# Patient Record
Sex: MALE
[De-identification: names, ages, dates, MRNs, and addresses within clinical notes are randomized per-mention and may not be internally consistent; named-entity substitution may affect disease eponyms.]

---

## 2021-08-23 ENCOUNTER — HOSPITAL ENCOUNTER (EMERGENCY)
Dept: HOSPITAL 97 - ER | Age: 51
LOS: 1 days | Discharge: HOME | End: 2021-08-24
Payer: SELF-PAY

## 2021-08-23 DIAGNOSIS — F17.210: ICD-10-CM

## 2021-08-23 DIAGNOSIS — I10: Primary | ICD-10-CM

## 2021-08-23 PROCEDURE — 71045 X-RAY EXAM CHEST 1 VIEW: CPT

## 2021-08-23 PROCEDURE — 84484 ASSAY OF TROPONIN QUANT: CPT

## 2021-08-23 PROCEDURE — 85610 PROTHROMBIN TIME: CPT

## 2021-08-23 PROCEDURE — 83735 ASSAY OF MAGNESIUM: CPT

## 2021-08-23 PROCEDURE — 80048 BASIC METABOLIC PNL TOTAL CA: CPT

## 2021-08-23 PROCEDURE — 96374 THER/PROPH/DIAG INJ IV PUSH: CPT

## 2021-08-23 PROCEDURE — 85025 COMPLETE CBC W/AUTO DIFF WBC: CPT

## 2021-08-23 PROCEDURE — 99285 EMERGENCY DEPT VISIT HI MDM: CPT

## 2021-08-23 PROCEDURE — 83880 ASSAY OF NATRIURETIC PEPTIDE: CPT

## 2021-08-23 PROCEDURE — 36415 COLL VENOUS BLD VENIPUNCTURE: CPT

## 2021-08-23 PROCEDURE — 93005 ELECTROCARDIOGRAM TRACING: CPT

## 2021-08-23 PROCEDURE — 80076 HEPATIC FUNCTION PANEL: CPT

## 2021-08-23 NOTE — XMS REPORT
Continuity of Care Document

                           Created on:2021



Patient:VENUS ENGLISH

Sex:Male

:1970

External Reference #:[id number]





Demographics







                          Address                   [address] [place] [id number]

 

                          Home Phone                [phone]

 

                          Work Phone                [phone]

 

                          Email Address             NONE

 

                          Preferred Language        Unknown

 

                          Marital Status            Unknown

 

                          Nondenominational Affiliation     Unknown

 

                          Race                      Unknown

 

                          Additional Race(s)        Unavailable

 

                          Ethnic Group              Unknown









Author







                          Organization              [facility]

t

 

                          Address                   [address] [place] [address] [address]



                                                    [place] [address]

 

                          Phone                     [phone]









Care Team Providers







                    Name                Role                Phone

 

                    Unavailable         Unavailable         Unavailable









Problems

This patient has no known problems.



Allergies, Adverse Reactions, Alerts

This patient has no known allergies or adverse reactions.



Medications

This patient has no known medications.



Procedures

This patient has no known procedures.



Results

This patient has no known results.

## 2021-08-24 VITALS — DIASTOLIC BLOOD PRESSURE: 87 MMHG | OXYGEN SATURATION: 98 % | SYSTOLIC BLOOD PRESSURE: 110 MMHG

## 2021-08-24 LAB
ALBUMIN SERPL BCP-MCNC: 3.6 G/DL (ref 3.4–5)
ALP SERPL-CCNC: 62 U/L (ref 45–117)
ALT SERPL W P-5'-P-CCNC: 49 U/L (ref 12–78)
AST SERPL W P-5'-P-CCNC: 39 U/L (ref 15–37)
BUN BLD-MCNC: 12 MG/DL (ref 7–18)
GLUCOSE SERPLBLD-MCNC: 129 MG/DL (ref 74–106)
HCT VFR BLD CALC: 44.2 % (ref 39.6–49)
INR BLD: 1.04
LYMPHOCYTES # SPEC AUTO: 2.5 K/UL (ref 0.7–4.9)
MAGNESIUM SERPL-MCNC: 2 MG/DL (ref 1.8–2.4)
NT-PROBNP SERPL-MCNC: 160 PG/ML (ref ?–125)
PMV BLD: 9.5 FL (ref 7.6–11.3)
POTASSIUM SERPL-SCNC: 3.7 MMOL/L (ref 3.5–5.1)
RBC # BLD: 5.32 M/UL (ref 4.33–5.43)
TROPONIN (EMERG DEPT USE ONLY): < 0.02 NG/ML (ref 0–0.04)

## 2021-08-24 NOTE — EDPHYS
Physician Documentation                                                                           

 [facility]                                                                 

Name: Philip Campos                                                                               

Age: 51 yrs                                                                                       

Sex: Male                                                                                         

: 1970                                                                                   

MRN: [id number]                                                                                   

Arrival Date: 2021                                                                          

Time: 23:36                                                                                       

Account#: [id number]                                                                            

Bed 3                                                                                             

Private MD:                                                                                       

ED Physician Daniel Domínguez                                                                     

HPI:                                                                                              

                                                                                             

06:52 This 51 yrs old  Male presents to ER via EMS with complaints of Is a            tw4 

      51-year-old male comes in complaining of high blood pressure after being noncompliant       

      with his medication.                                                                        

06:52 The patient has elevated blood pressure and discovered this at home. Onset: The         tw4 

      symptoms/episode began/occurred today. Modifying factors: The symptoms are aggravated       

      by. Associated signs and symptoms: Pertinent positives: dizziness, lightheadedness,         

      Pertinent negatives: chest pain, headache, nausea, visual changes. The patient has not      

      experienced similar symptoms in the past.                                                   

                                                                                                  

Historical:                                                                                       

- Allergies:                                                                                      

                                                                                             

23:48 No Known Allergies;                                                                     jb4 

- Home Meds:                                                                                      

23:48 Lisinopril Oral [Active];                                                               jb4 

- PMHx:                                                                                           

23:48 Back problems/pain; Hypertension;                                                       jb4 

- PSHx:                                                                                           

23:48 None;                                                                                   jb4 

                                                                                                  

- Immunization history:: Adult Immunizations up to date.                                          

- Social history:: Smoking status: Patient reports the use of cigarette tobacco                   

  products, smokes one-half pack cigarettes per day, Patient uses alcohol, only on a              

  social basis. street drugs, marijuana.                                                          

                                                                                                  

                                                                                                  

ROS:                                                                                              

                                                                                             

06:52 Constitutional: Negative for fever, chills, and weight loss, Eyes: Negative for injury, tw4 

      pain, redness, and discharge, Cardiovascular: Negative for chest pain, palpitations,        

      and edema, Respiratory: Negative for shortness of breath, cough, wheezing, and              

      pleuritic chest pain, Abdomen/GI: Negative for abdominal pain, nausea, vomiting,            

      diarrhea, and constipation, Back: Negative for injury and pain, MS/Extremity: Negative      

      for injury and deformity, Skin: Negative for injury, rash, and discoloration, Neuro:        

      Negative for headache, weakness, numbness, tingling, and seizure.                           

                                                                                                  

Exam:                                                                                             

06:52 Constitutional:  This is a well developed, well nourished patient who is awake, alert,  tw4 

      and in no acute distress. Head/Face:  Normocephalic, atraumatic. Chest/axilla:  Normal      

      chest wall appearance and motion.  Nontender with no deformity.  No lesions are             

      appreciated. Cardiovascular:  Regular rate and rhythm with a normal S1 and S2.  No          

      gallops, murmurs, or rubs.  Normal PMI, no JVD.  No pulse deficits. Respiratory:  Lungs     

      have equal breath sounds bilaterally, clear to auscultation and percussion.  No rales,      

      rhonchi or wheezes noted.  No increased work of breathing, no retractions or nasal          

      flaring. Abdomen/GI:  Soft, non-tender, with normal bowel sounds.  No distension or         

      tympany.  No guarding or rebound.  No evidence of tenderness throughout. Back:  No          

      spinal tenderness.  No costovertebral tenderness.  Full range of motion. MS/ Extremity:     

       Pulses equal, no cyanosis.  Neurovascular intact.  Full, normal range of motion.           

      Neuro:  Awake and alert, GCS 15, oriented to person, place, time, and situation.            

      Cranial nerves II-XII grossly intact.  Motor strength 5/5 in all extremities.  Sensory      

      grossly intact.  Cerebellar exam normal.  Normal gait.                                      

                                                                                                  

Vital Signs:                                                                                      

                                                                                             

23:46  / 125; Pulse 70; Resp 16; Pulse Ox 100% on R/A; Weight 107.95 kg (R); Height 5   jb4 

      ft. 5 in. (165.10 cm); Pain 7/10;                                                           

                                                                                             

01:00  / 123; Pulse 78; Resp 22; Pulse Ox 100% on R/A;                                  jb4 

02:00  / 100; Pulse 70; Resp 13; Pulse Ox 100% on R/A;                                  jb4 

03:20  / 87; Pulse 78; Resp 16; Pulse Ox 98% on R/A;                                    jb4 

                                                                                             

23:46 Body Mass Index 39.60 (107.95 kg, 165.10 cm)                                            jb4 

                                                                                                  

MDM:                                                                                              

                                                                                             

23:43 Patient medically screened.                                                             tw4 

                                                                                             

06:54 Differential diagnosis: hypertensive crisis, Malignant HTN. Data reviewed: vital signs, tw4 

      nurses notes. Data interpreted: Pulse oximetry: Interpretation: normal. Counseling: I       

      had a detailed discussion with the patient and/or guardian regarding: the historical        

      points, exam findings, and any diagnostic results supporting the discharge/admit            

      diagnosis. Medication response: hydralazine. Special discussion: I discussed with the       

      patient/guardian in detail that at this point there is no indication for admission to       

      the hospital. It is understood, however, that if the symptoms persist or worsen the         

      patient needs to return immediately for re-evaluation.                                      

                                                                                                  

                                                                                             

00:15 Order name: Basic Metabolic Panel; Complete Time: 02:                                                                                             

02:31 Interpretation: Normal except: GLUC 129.                                                                                                                                             

00:15 Order name: CBC with Diff; Complete Time: 02:                                                                                             

02:32 Interpretation: Within normal limits.                                                                                                                                                

00:15 Order name: LFT's; Complete Time: 02:                                                                                             

02:32 Interpretation: Normal except: AST 39; GLOB 3.6; A/G 1.0.                                                                                                                            

00:15 Order name: Magnesium; Complete Time: 02:                                                                                             

02:32 Interpretation: Within normal limits: MG 2.0.                                                                                                                                        

00:15 Order name: NT PRO-BNP; Complete Time: 02:                                                                                             

02:32 Interpretation: Normal except: NT PRO-.                                                                                                                                       

00:15 Order name: PT-INR; Complete Time: 02:                                                                                             

02:32 Interpretation: Within normal limits: PT 12.0; INR <p>1.04</p>.                                                                                                                      

00:15 Order name: Troponin (emerg Dept Use Only); Complete Time: 02:                                                                                             

02:32 Interpretation: Within normal limits: TROPED < 0.02.                                                                                                                                 

00:15 Order name: XRAY Chest (1 view)                                                                                                                                                      

00:15 Order name: EKG; Complete Time: 00:16                                                                                                                                                

00:15 Order name: Cardiac monitoring; Complete Time: 01:                                                                                             

00:15 Order name: EKG - Nurse/Tech; Complete Time: :                                                                                             

00:15 Order name: IV Saline Lock; Complete Time: :                                                                                             

00:15 Order name: Labs collected and sent; Complete Time: 01:                                                                                             

00:15 Order name: O2 Per Protocol; Complete Time: :                                                                                             

00:15 Order name: O2 Sat Monitoring; Complete Time: 01:03                                     tw4 

                                                                                                  

EC:52 Rate is 72 beats/min. Rhythm is regular. QRS Axis is Normal. [place] interval is normal. QRS tw4 

      interval is normal. QT interval is normal. No Q waves. T waves are Inverted in leads        

      III, V6. No ST changes noted. Clinical impression: NSR w/ Non-specific ST/T Changes.        

      Interpreted by me. Reviewed by me.                                                          

                                                                                                  

Administered Medications:                                                                         

: Drug: hydrALAZINE 20 mg Route: IVP; Site: right antecubital;                            jb4 

03:00 Follow up: Response: No adverse reaction; Blood pressure is lowered                     jb4 

01:22 Drug: cloNIDine 0.2 mg Route: PO;                                                       jb4 

03:00 Follow up: Response: No adverse reaction; Blood pressure is lowered                     jb4 

                                                                                                  

                                                                                                  

Disposition Summary:                                                                              

21 02:49                                                                                    

Discharge Ordered                                                                                 

      Location: Home                                                                          tw4 

      Problem: new                                                                            tw4 

      Symptoms: have improved                                                                 tw4 

      Condition: Stable                                                                       tw4 

      Diagnosis                                                                                   

        - Essential (primary) hypertension                                                    tw4 

      Followup:                                                                               tw4 

        - With: Private Physician                                                                  

        - When: Upon discharge from the Emergency Department                                       

        - Reason: Recheck today's complaints, Continuance of care, Re-evaluation by your           

      physician                                                                                   

      Discharge Instructions:                                                                     

        - Discharge Summary Sheet                                                             tw4 

        - Hypertension, Adult                                                                 tw4 

        - Hypertension, Adult, Easy-to-Read                                                   tw4 

      Forms:                                                                                      

        - Medication Reconciliation Form                                                      tw4 

        - Thank You Letter                                                                    tw4 

        - Antibiotic Education                                                                tw4 

        - Prescription Opioid Use                                                             tw4 

      Prescriptions:                                                                              

        - Norvasc 10 mg Oral Tablet                                                                

            - take 1 tablet by ORAL route once daily; 30 tablet; Refills: 0, Product          tw4 

      Selection Permitted                                                                         

Signatures:                                                                                       

Dispatcher [employer]                           Octavio Barr RN RN   jb4                                                  

Daniel Domínguez MD MD   tw4                                                  

                                                                                                  

Corrections: (The following items were deleted from the chart)                                    

                                                                                             

23:50 23:48 Allergies: Aspirin; jb4                                                           jb4 

                                                                                             

01:48 01:43 Manual Differential ordered. EDMS                                                 EDMS

                                                                                                  

**************************************************************************************************

## 2021-08-24 NOTE — EKG
Test Date:    2021-08-24               Test Time:    00:56:48

Technician:   MARIA DOLORES                                    

                                                     

MEASUREMENT RESULTS:                                       

Intervals:                                           

Rate:         72                                     

WA:           134                                    

QRSD:         100                                    

QT:           432                                    

QTc:          473                                    

Axis:                                                

P:            50                                     

WA:           134                                    

QRS:          56                                     

T:            17                                     

                                                     

INTERPRETIVE STATEMENTS:                                       

                                                     

Normal sinus rhythm

Nonspecific T wave abnormality

Prolonged QT

Abnormal ECG

Compared to ECG 12/28/2015 13:58:42

T-wave abnormality now present

Prolonged QT interval now present



Electronically Signed On 08-24-21 07:36:12 CDT by Jeff Ellis

## 2021-08-24 NOTE — ER
Nurse's Notes                                                                                     

 [facility]                                                                 

Name: Philip Campos                                                                               

Age: 51 yrs                                                                                       

Sex: Male                                                                                         

: 1970                                                                                   

MRN: [id number]                                                                                   

Arrival Date: 2021                                                                          

Time: 23:36                                                                                       

Account#: [id number]                                                                            

Bed 3                                                                                             

Private MD:                                                                                       

Diagnosis: Essential (primary) hypertension                                                       

                                                                                                  

Presentation:                                                                                     

                                                                                             

23:46 Chief complaint: EMS states: Pt is complaining of HTN, has a history of HTN and as      jb4 

      locked up for 5 days in [facility] without the ability to take his medication. Is now             

      reporting headache, dizziness, and nausea. His b/p read 206/130. Coronavirus screen:        

      Client denies travel out of the [place] in the last 14 days. Ebola Screen: No symptoms or      

      risks identified at this time. Initial Sepsis Screen: Does the patient meet any 2           

      criteria? No. Patient's initial sepsis screen is negative. Does the patient have a          

      suspected source of infection? No. Patient's initial sepsis screen is negative. Risk        

      Assessment: Do you want to hurt yourself or someone else? Patient reports no desire to      

      harm self or others. Onset of symptoms was 2021. Transition of care: patient     

      was not received from another setting of care.                                              

23:46 Method Of Arrival: EMS: [facility]                                                jb4 

23:46 Acuity: DALIA 3                                                                           jb4 

                                                                                                  

Historical:                                                                                       

- Allergies:                                                                                      

23:48 No Known Allergies;                                                                     jb4 

- Home Meds:                                                                                      

23:48 Lisinopril Oral [Active];                                                               jb4 

- PMHx:                                                                                           

23:48 Back problems/pain; Hypertension;                                                       jb4 

- PSHx:                                                                                           

23:48 None;                                                                                   jb4 

                                                                                                  

- Immunization history:: Adult Immunizations up to date.                                          

- Social history:: Smoking status: Patient reports the use of cigarette tobacco                   

  products, smokes one-half pack cigarettes per day, Patient uses alcohol, only on a              

  social basis. street drugs, marijuana.                                                          

                                                                                                  

                                                                                                  

Screenin:50 Abuse screen: Denies threats or abuse. Nutritional screening: No deficits noted.        jb4 

      Tuberculosis screening: No symptoms or risk factors identified. Fall Risk None              

      identified.                                                                                 

                                                                                                  

Assessment:                                                                                       

23:50 General: Appears in no apparent distress. comfortable, Behavior is calm, cooperative,   jb4 

      appropriate for age. Pain: Complains of pain in headache Pain does not radiate. Pain        

      currently is 7 out of 10 on a pain scale. Neuro: Level of Consciousness is awake,           

      alert, obeys commands, Oriented to person, place, time, situation. Cardiovascular:          

      Patient's skin is warm and dry. Respiratory: Airway is patent Respiratory effort is         

      even, unlabored, Respiratory pattern is regular, symmetrical. GI: No signs and/or           

      symptoms were reported involving the gastrointestinal system. : No signs and/or           

      symptoms were reported regarding the genitourinary system. EENT: No signs and/or            

      symptoms were reported regarding the EENT system. Derm: Skin is intact, Skin is pink,       

      warm \T\ dry. normal. Musculoskeletal: No signs and/or symptoms reported regarding the      

      musculoskeletal system.                                                                     

                                                                                             

01:00 Reassessment: Patient appears in no apparent distress at this time. Patient and/or      jb4 

      family updated on plan of care and expected duration. Pain level reassessed. Patient is     

      alert, oriented x 3, equal unlabored respirations, skin warm/dry/pink.                      

02:13 Reassessment: Patient appears in no apparent distress at this time. Patient and/or      jb4 

      family updated on plan of care and expected duration. Pain level reassessed. Patient is     

      alert, oriented x 3, equal unlabored respirations, skin warm/dry/pink.                      

03:29 Reassessment: Patient appears in no apparent distress at this time. Patient and/or      jb4 

      family updated on plan of care and expected duration. Pain level reassessed. Patient is     

      alert, oriented x 3, equal unlabored respirations, skin warm/dry/pink.                      

                                                                                                  

Vital Signs:                                                                                      

                                                                                             

23:46  / 125; Pulse 70; Resp 16; Pulse Ox 100% on R/A; Weight 107.95 kg (R); Height 5   4 

      ft. 5 in. (165.10 cm); Pain 7/10;                                                           

                                                                                             

01:00  / 123; Pulse 78; Resp 22; Pulse Ox 100% on R/A;                                  jb4 

02:00  / 100; Pulse 70; Resp 13; Pulse Ox 100% on R/A;                                  jb4 

03:20  / 87; Pulse 78; Resp 16; Pulse Ox 98% on R/A;                                    jb4 

                                                                                             

23:46 Body Mass Index 39.60 (107.95 kg, 165.10 cm)                                            [facility] 

                                                                                                  

ED Course:                                                                                        

                                                                                             

23:36 Patient arrived in ED.                                                                  mw2 

23:43 Daniel Domínguez MD is Attending Physician.                                            tw4 

23:46 Octavio Conklin RN is Primary Nurse.                                                     jb4 

23:48 Triage completed.                                                                       jb4 

23:48 Arm band placed on right wrist.                                                         jb4 

23:50 Patient has correct armband on for positive identification. Bed in low position. Call   jb4 

      light in reach. Side rails up X 1. Cardiac monitor on. Pulse ox on. NIBP on.                

                                                                                             

00:46 XRAY Chest (1 view) In Process Unspecified.                                             EDMS

01:07 Inserted saline lock: 22 gauge in right antecubital area, using aseptic technique.      ds4 

      Blood collected. Missed attempt(s): 20 gauge in right wrist. Bleeding controlled, band      

      aid applied, catheter tip intact.                                                           

03:20 No provider procedures requiring assistance completed. IV discontinued, intact,         jb4 

      bleeding controlled, No redness/swelling at site. Pressure dressing applied.                

                                                                                                  

Administered Medications:                                                                         

01:22 Drug: hydrALAZINE 20 mg Route: IVP; Site: right antecubital;                            4 

03:00 Follow up: Response: No adverse reaction; Blood pressure is lowered                     [facility] 

01:22 Drug: cloNIDine 0.2 mg Route: PO;                                                       jb4 

03:00 Follow up: Response: No adverse reaction; Blood pressure is lowered                     [facility] 

                                                                                                  

                                                                                                  

Outcome:                                                                                          

02:49 Discharge ordered by MD.                                                                tw4 

03:20 Discharged to home ambulatory.                                                          jb4 

03:20 Condition: stable                                                                           

03:20 Discharge instructions given to patient, Instructed on discharge instructions, follow       

      up and referral plans. medication usage, Demonstrated understanding of instructions,        

      follow-up care, medications, Prescriptions given X 1.                                       

03:30 Patient left the ED.                                                                    4 

                                                                                                  

Signatures:                                                                                       

Dispatcher [employer]                           [facility]                                                 

Flip Oneill                             4                                                  

Octavio Conklin RN                       RN   jb4                                                  

Daniel Domínguez MD MD   tw4                                                  

Dangelo Prather                            mw2                                                  

                                                                                                  

Corrections: (The following items were deleted from the chart)                                    

                                                                                             

23:50 23:48 Allergies: Aspirin; jb4                                                           jb4 

                                                                                                  

**************************************************************************************************

## 2021-08-24 NOTE — RAD REPORT
EXAM DESCRIPTION:  Fina Single View8/24/2021 12:46 am

 

CLINICAL HISTORY:  Hypertension

 

COMPARISON:  2019

 

FINDINGS:   The lungs appear clear of acute infiltrate. The heart is mildly enlarged

 

IMPRESSION:   No acute abnormalities displayed

## 2022-05-14 ENCOUNTER — HOSPITAL ENCOUNTER (EMERGENCY)
Dept: HOSPITAL 97 - ER | Age: 52
Discharge: HOME | End: 2022-05-14
Payer: SELF-PAY

## 2022-05-14 VITALS — TEMPERATURE: 97.5 F

## 2022-05-14 VITALS — OXYGEN SATURATION: 99 %

## 2022-05-14 VITALS — DIASTOLIC BLOOD PRESSURE: 90 MMHG | SYSTOLIC BLOOD PRESSURE: 154 MMHG

## 2022-05-14 DIAGNOSIS — F17.210: ICD-10-CM

## 2022-05-14 DIAGNOSIS — R07.81: ICD-10-CM

## 2022-05-14 DIAGNOSIS — S63.265A: ICD-10-CM

## 2022-05-14 DIAGNOSIS — I10: Primary | ICD-10-CM

## 2022-05-14 DIAGNOSIS — M79.642: ICD-10-CM

## 2022-05-14 LAB
BUN BLD-MCNC: 11 MG/DL (ref 7–18)
GLUCOSE SERPLBLD-MCNC: 138 MG/DL (ref 74–106)
HCT VFR BLD CALC: 42.6 % (ref 39.6–49)
LYMPHOCYTES # SPEC AUTO: 2.2 K/UL (ref 0.7–4.9)
PMV BLD: 8.7 FL (ref 7.6–11.3)
POTASSIUM SERPL-SCNC: 3.6 MMOL/L (ref 3.5–5.1)
RBC # BLD: 5.23 M/UL (ref 4.33–5.43)

## 2022-05-14 PROCEDURE — 36415 COLL VENOUS BLD VENIPUNCTURE: CPT

## 2022-05-14 PROCEDURE — 85025 COMPLETE CBC W/AUTO DIFF WBC: CPT

## 2022-05-14 PROCEDURE — 71045 X-RAY EXAM CHEST 1 VIEW: CPT

## 2022-05-14 PROCEDURE — 0RSVXZZ REPOSITION LEFT METACARPOPHALANGEAL JOINT, EXTERNAL APPROACH: ICD-10-PCS

## 2022-05-14 PROCEDURE — 99284 EMERGENCY DEPT VISIT MOD MDM: CPT

## 2022-05-14 PROCEDURE — 93005 ELECTROCARDIOGRAM TRACING: CPT

## 2022-05-14 PROCEDURE — 80048 BASIC METABOLIC PNL TOTAL CA: CPT

## 2022-05-14 PROCEDURE — 84484 ASSAY OF TROPONIN QUANT: CPT

## 2022-05-14 NOTE — RAD REPORT
EXAM DESCRIPTION:  RAD - Hand Left 2 View - 5/14/2022 9:51 am

 

CLINICAL HISTORY:  PAIN

 

COMPARISON:  Hand Left 2 View dated 5/14/2022

 

FINDINGS/IMPRESSION:  The fourth digit remains dislocated in the volar direction.
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
## 2022-05-14 NOTE — EKG
Test Date:    2022-05-14               Test Time:    06:11:56

Technician:   MELA                                     

                                                     

MEASUREMENT RESULTS:                                       

Intervals:                                           

Rate:         75                                     

WV:           134                                    

QRSD:         96                                     

QT:           426                                    

QTc:          475                                    

Axis:                                                

P:            51                                     

WV:           134                                    

QRS:          51                                     

T:            2                                      

                                                     

INTERPRETIVE STATEMENTS:                                       

                                                     

Normal sinus rhythm

Nonspecific T wave abnormality

Prolonged QT

Abnormal ECG

Compared to ECG 08/24/2021 00:56:48

No significant changes



Electronically Signed On 05-14-22 14:54:18 CDT by Frandy Karimi

## 2022-05-14 NOTE — XMS REPORT
Continuity of Care Document

                             Created on:May 14, 2022



Patient:VENUS ENGLISH

Sex:Male

:1970

External Reference #:[id number]





Demographics







                          Address                   [address] [place] [address] [address]



                                                    [place] [address]

 

                          Home Phone                [phone]

 

                          Work Phone                [phone]

 

                          Mobile Phone              [phone]

 

                          Email Address             [email or web address]

 

                          Preferred Language        English

 

                          Marital Status            Unknown

 

                          Anabaptism Affiliation     Unknown

 

                          Race                      Unknown

 

                          Additional Race(s)        Unavailable

 

                          Ethnic Group              Unknown









Author







                          Organization              [facility]

t

 

                          Address                   [address] [place] [address] [address]



                                                    [place] [address]

 

                          Phone                     [phone]









Support







                Name            Relationship    Address         Phone

 

                GASTIN          Significant     [phone]      [phone]



                                                [place] [address] 

 

                BASHIR MORGAN Relative        [address]     Unavailable



                                                [place] [phone] 

 

                JEAN PAUL MEDRANO Significant     [address]  Unavailable



                                                [place] [address] 

 

                Sanketel Valrico  Significant Other [address]  [phone]

[phone]



                                                [place] [address] 

 

                Bashir Morgan Relative        [address]     [phone]



                                                [place] [address] 









Care Team Providers







                    Name                Role                Phone

 

                    PCP,  DOES NOT HAVE A Primary Care Physician Unavailable

 

                    LILLY             Attending Clinician Unavailable

 

                    Lilly FNP         Attending Clinician [phone]

 

                    Doctor Unassigned,  Name Attending Clinician Unavailable

 

                    ZOHRA Bahena MD       Attending Clinician [phone]

 

                    ZOHRA BAHENA          Attending Clinician Unavailable









Payers







           Payer Name Policy Type Policy Number Effective Date Expiration Date [facility]

 

           MEDICAID SSI            PENDING    2022            



           PENDING                          00:00:00              







Problems







       Condition Condition Condition Status Onset  Resolution Last   Treating Co

mments 

Source



       Name   Details Category        Date   Date   Treatment Clinician        



                                                 Date                 

 

       No known No known Disease                                           Unive

rs



       active active                                                  ity of



       problems problems                                                  [facility]







Allergies, Adverse Reactions, Alerts







       Allergy Allergy Status Severity Reaction(s) Onset  Inactive Treating Comm

ents 

Source



       Name   Type                        Date   Date   Clinician        

 

       NO KNOWN Drug   Active                                           [facility]



       ALLERGIE Class                                                   ity of



       S                                                              [facility]







Social History







           Social Habit Start Date Stop Date  Quantity   Comments   Source

 

           Exposure to                       Not sure              [facility]

 [place]



           SARS-CoV-2 (event)                                             Medica

l Branch

 

           Sex Assigned At 1970                       [facility]



           Birth      00:00:00   00:00:00                         Medical Branch









                Smoking Status  Start Date      Stop Date       Source

 

                Unknown if ever smoked                                 [facility]







Medications







       Ordered Filled Start  Stop   Current Ordering Indication Dosage Frequency

 Signature

                    Comments            Components          Source



     Medication Medication Date Date Medication? Clinician                (SIG) 

          



     Name Name                                                   

 

     HYDROcodone      2022- No             1{tbl}      1 tablet,         

  Univers



     -acetaminop                                Oral,           ity of



     hen (NORCO      04:00: 02:57                          ONCE, 1           Fritz

as



     5) 5-325 mg      00   :00                           dose, On           Medi

jacinta



     tablet 1                                         Tue            Branch



     tablet                                         22 at           



                                                  2200, ASAP           

 

     lisinopriL      2022- No             20mg      20 mg,           Univ

ers



     (PRINIVIL,Z                                Oral, ONCE           i

ty of



     ESTRIL)      02:45: 01:54                          NOW, 1           [place]



     tablet 20      00   :00                           dose, On           Medica

l



     mg                                           e            Branch



                                                  22 at           



                                                  204, ASAP           

 

     lidocaine      2022- No             5mL       5 mL,           Univer

s



     1%                                  Infiltrati           ity of



     (XYLOCAINE)      02:45: 02:45                          on, ONCE,           

[place]



     10 mg/mL (1      00   :00                           1 dose, On           Me

dical



     %)                                           Tue            Branch



     injection 5                                         22 at           



     mL                                           , ASAP           

 

     lisinopriL            Yes       [id number] 20mg      Take 2           

Univers



     10 mg      1-25                               tablets by           ity of



     tablet      00:00:                               mouth at           [place]



               00                                 bedtime.           Medical



                                                                 Branch

 

     butalbital-      2021- No             1{tbl}      1 tablet,         

  [facility]



     acetaminoph                                Oral,           ity of



     en-caff      11:15: 10:08                          ONCE, 1           [place]



     (ESGIC)      00   :00                           dose, On           Medical



     -40                                         Thu            Branch



     mg tablet 1                                         21 at           



     tablet                                         0615,           



                                                  Routine           

 

     amoxicillin            Yes       [id number] 500mg      Take 1          

 [facility]



     500 mg      6-09                               capsule by           ity of



     capsule      00:00:                               mouth 3           [place]



               00                                 (three)           Medical



                                                  times           Branch



                                                  daily.           

 

     ibuprofen            Yes       [id number] 800mg      Take 1           U

nivers



     800 mg      6-09                               tablet by           ity of



     tablet      00:00:                               mouth           [place]



               00                                 every 8           Medical



                                                  (eight)           Branch



                                                  hours.           

 

     traMADOL 50      2019-0      Yes       [id number] 50mg      Take 1           

Univers



     mg tablet      6-09                               tablet by           ity o

f



               00:00:                               mouth           [place]



               00                                 every 6           Medical



                                                  (six)           Branch



                                                  hours as           



                                                  needed for           



                                                  Pain           



                                                  (scale           



                                                  4-6).           

 

     hydroCHLORO      2019-0      Yes       [id number] 25mg      Take 1           

Univers



     thiazide 25      6-09                               tablet by           ity

 of



     mg tablet      00:00:                               mouth           [place]



               00                                 every           Medical



                                                  morning.           Branch

 

     amoxicillin      2019-0      Yes       [id number] 500mg      Take 1          

 [facility]



     500 mg      6-09                               capsule by           ity of



     capsule      00:00:                               mouth 3           [place]



               00                                 (three)           Medical



                                                  times           Branch



                                                  daily.           

 

     ibuprofen      2019-0      Yes       [id number] 800mg      Take 1           U

nivers



     800 mg      6-09                               tablet by           ity of



     tablet      00:00:                               mouth           [place]



               00                                 every 8           Medical



                                                  (eight)           Branch



                                                  hours.           

 

     traMADOL 50      2019-0      Yes       [id number] 50mg      Take 1           

Univers



     mg tablet      6-09                               tablet by           ity o

f



               00:00:                               mouth           [place]



               00                                 every 6           Medical



                                                  (six)           Branch



                                                  hours as           



                                                  needed for           



                                                  Pain           



                                                  (scale           



                                                  4-6).           

 

     hydroCHLORO      2019-0      Yes       [id number] 25mg      Take 1           

Univers



     thiazide 25      6-09                               tablet by           ity

 of



     mg tablet      00:00:                               mouth           [place]



               00                                 every           Medical



                                                  morning.           Branch

 

     amoxicillin      2019-0      Yes       [id number] 500mg      Take 1          

 [facility]



     500 mg      6-09                               capsule by           ity of



     capsule      00:00:                               mouth 3           [place]



               00                                 (three)           Medical



                                                  times           Branch



                                                  daily.           

 

     ibuprofen      2019-0      Yes       [id number] 800mg      Take 1           U

nivers



     800 mg      6-09                               tablet by           ity of



     tablet      00:00:                               mouth           [place]



               00                                 every 8           Medical



                                                  (eight)           Branch



                                                  hours.           

 

     traMADOL 50      2019-0      Yes       [id number] 50mg      Take 1           

Univers



     mg tablet      6-09                               tablet by           ity o

f



               00:00:                               mouth           [place]



               00                                 every 6           Medical



                                                  (six)           Branch



                                                  hours as           



                                                  needed for           



                                                  Pain           



                                                  (scale           



                                                  4-6).           

 

     hydroCHLORO      2019-0      Yes       [id number] 25mg      Take 1           

Univers



     thiazide 25      6-09                               tablet by           ity

 of



     mg tablet      00:00:                               mouth           [place]



               00                                 every           Medical



                                                  morning.           Branch







Immunizations







           Ordered    Filled Immunization Date       Status     Comments   [facility]

e



           Immunization Name Name                                        

 

           Td                    2022 Completed             [facility]



                                 00:00:00                         [facility]







Vital Signs







             Vital Name   Observation Time Observation Value Comments     Source

 

             Systolic blood 2022 03:02:00 181 mm[Hg]                Univer

sity of



             pressure                                            [place] [facility]



                                                                 [place]

 

             Diastolic blood 2022 03:02:00 126 mm[Hg]                Unive

rsity of



             pressure                                            [place] [facility]



                                                                 Branch

 

             Heart rate   2022 03:02:00 86 /min                   [facility]

ty of



                                                                 [place] [facility]



                                                                 [place]

 

             Respiratory rate 2022 03:02:00 18 /min                   Univ

ersity of



                                                                 [place] [facility]



                                                                 [place]

 

             Oxygen saturation in 2022 03:02:00 98 /min                   

University of



             Arterial blood by                                        [place] [facility]

jacinta



             Pulse oximetry                                        Branch

 

             Body temperature 2022 01:42:00 36.11 Karlie                 Univ

ersity of



                                                                 [place] [facility]



                                                                 [place]

 

             Body height  2022 01:42:00 165.1 cm                  Universi

ty of



                                                                 [place] [facility]



                                                                 [place]

 

             Body weight  2022 01:42:00 113.399 kg                Universi

ty of



                                                                 [place] [facility]



                                                                 [place]

 

             BMI          2022 01:42:00 41.60 kg/m2               Universi

ty of



                                                                 [facility]

 

             Systolic blood 2021 10:49:00 161 mm[Hg]                Univer

sity of



             pressure                                            [place] [facility]



                                                                 [place]

 

             Diastolic blood 2021 10:49:00 100 mm[Hg]                Unive

rsity of



             pressure                                            [place] [facility]



                                                                 [place]

 

             Heart rate   2021 10:49:00 62 /min                   Universi

ty of



                                                                 [place] [facility]



                                                                 [place]

 

             Oxygen saturation in 2021 10:49:00 96 /min                   

University of



             Arterial blood by                                        [place] [facility]

jacinta



             Pulse oximetry                                        Branch

 

             Respiratory rate 2021 10:00:00 19 /min                   Univ

ersity of



                                                                 [facility]

 

             Body temperature 2021 09:34:00 37 Karlie                    Univ

ersity of



                                                                 [place] [facility]



                                                                 [place]

 

             Body height  2021 09:34:00 165.1 cm                  Universi

ty of



                                                                 [place] [facility]



                                                                 [place]

 

             Body weight  2021 09:34:00 106.595 kg                Universi

ty of



                                                                 [place] [facility]



                                                                 [place]

 

             BMI          2021 09:34:00 39.11 kg/m2               Universi

ty of



                                                                 [place] [facility]



                                                                 [place]







Procedures







                Procedure       Date / Time Performed Performing Clinician [facility]

e

 

                CONSENT/REFUSAL FOR 2022 01:30:53 Doctor Unassigned, No Un

[facility]



                DIAGNOSIS AND                   Name            Medical Branch



                TREATMENT                                       

 

                EKG-12 LEAD     2021 10:33:13 Sandie Bahena [facility] [place]

 

                TROPONIN I      2021 09:51:00 Sandie Bahena [facility] [place]

 

                COMP. METABOLIC PANEL 2021 09:51:00 Sandie Bahena [facility]



                ([id number])                                         [facility]

 

                CBC WITH DIFF   2021 09:51:00 Sandie Bahena [facility] [place]







Encounters







        Start   End     Encounter Admission Attending Care    Care    Encounter 

Source



        Date/Time Date/Time Type    Type    Clinicians Facility Department ID   

   

 

        2022 Emergency X       [facility]    ERT     [id number]

[id number] [facility]



        19:34:00 21:30:00                 [facility]

 

        2022 [facility]    [id number]

[id number] Univers



        19:34:00 21:30:00                 Cindy BYRD [id number]         i

ty of



                                                [place] [id number]         [facility]  [phone]         [address]

 

        2022 Orders          Doctor  SHAHNAZ    [id number]

[id number] Univers



        00:00:00 00:00:00 Only            Unassigned, LEVON   [id number]       

  ity of



                                        [facility] [id number]         Fritz

as



                                                        [phone]         Medi

jacinta



                                                        [address]             Branch

 

        2021 Emergency         [facility]    [id number]

[id number] Univers



        04:30:00 05:49:00                 Sandie Byrd [id number]         

ity of



                                                [place] [id number]         [facility]  [phone]         [address]

 

        2021 Emergency X       [facility]    ERT     [id number]

[id number] Univers



        04:30:00 04:30:00                 SANDIE                          [facility]







Results







           Test Description Test Time  Test Comments Results    Result Comments 

Source









                    TROPONIN I          2021 10:27:12 









                      Test Item  Value      Reference Range Interpretation Comme

nts









             TROPONIN I (test code = 0.038 ng/mL  See_Comment  H             [Au

tomated message] The



             [id number])                                         system which ge

nerated



                                                                 this result tra

nsmitted



                                                                 reference range

: <=0.034.



                                                                 The reference r

shagufta was



                                                                 not used to int

erpret



                                                                 this result as



                                                                 normal/abnormal

.

 

             BEN (test code = BEN) Reference (Normal)                           



                          Range (defined by the                           



                          99th percentile                           



                          reference limit): <=                           



                          0.034 ng/mL Note:                           



                          Cardiac troponin begins                           



                          to rise 3-4 hours after                           



                          the onset of ischemia.                           



                          Repeat in 4-6 hours if                           



                          the sample was drawn                           



                          within 3-4 hours of the                           



                          onset of the symptom                           



                          and found normal.                           



                          Diagnosis of myocardial                           



                          injury is made with                           



                          acute changes in cTn                           



                          concentrations with at                           



                          least one serial sample                           



                          above the 99th                           



                          percentile upper                           



                          reference limit (URL),                           



                          taken together with the                           



                          patient's clinical                           



                          presentation.  Biotin                           



                          has been reported to                           



                          cause a negative bias,                           



                          interpret results                           



                          relative to patient's                           



                          use of biotin.                           

 

             Lab Interpretation Abnormal                               



             (test code = 16926-1)                                        



[facility]. METABOLIC PANEL [id number] 
10:16:56





             Test Item    Value        Reference Range Interpretation Comments

 

             NA (test code = 141 mmol/L   135-145                   



             6135123251)                                         

 

             K (test code = 4.1 mmol/L   3.5-5.0                   



             2278323850)                                         

 

             CL (test code = 104 mmol/L                       



             5449569930)                                         

 

             CO2 TOTAL (test code 29 mmol/L    23-31                     



             = 3596498066)                                        

 

             AGAP (test code =              2-16                      



             7355719083)                                         

 

             BUN (test code = 17 mg/dL     7-23                      



             5891917277)                                         

 

             GLUCOSE (test code = 102 mg/dL                        



             8944161911)                                         

 

             CREATININE (test code 0.89 mg/dL   0.60-1.25                 



             = 4963943465)                                        

 

             TOTAL BILI (test code 0.8 mg/dL    0.1-1.1                   



             = 3988915179)                                        

 

             CALCIUM (test code = 9.5 mg/dL    8.6-10.6                  



             0804128946)                                         

 

             T PROTEIN (test code 7.5 g/dL     6.3-8.2                   



             = 9846058891)                                        

 

             ALBUMIN (test code = 4.3 g/dL     3.5-5.0                   



             3984421606)                                         

 

             ALK PHOS (test code = 43 U/L                           



             3134574175)                                         

 

             ALTv (test code = 24 U/L       5-50                      



             1742-6)                                             

 

             AST(SGOT) (test code 30 U/L       13-40                     



             = 0106274408)                                        

 

             eGFR (test code =              mL/min/1.73m2              



             0947220253)                                         

 

             BEN (test code = BEN) Association of                           



                          Glomerular Filtration                           



                          Rate (GFR) and Staging                           



                          of Kidney Disease*                           



                          +-----------------------                           



                          +---------------------+-                           



                          ------------------------                           



                          +| GFR (mL/min/1.73 m2)                           



                          ?| With Kidney Damage ?|                           



                          ?Without Kidney                           



                          Damage+-----------------                           



                          ------+-----------------                           



                          ----+-------------------                           



                          ------+| ?>90 ? ? ? ? ?                           



                          ? ? ? ?| ?Stage one ? ?                           



                          ? ? ?| ? Normal ? ? ? ?                           



                          ? ? ?                                  



                          ?+----------------------                           



                          -+---------------------+                           



                          ------------------------                           



                          -+| ?60-89 ? ? ? ? ? ? ?                           



                          ?| ?Stage two ? ? ? ? ?|                           



                          ? Decreased GFR ? ? ? ?                           



                          +-----------------------                           



                          +---------------------+-                           



                          ------------------------                           



                          +| ?30-59 ? ? ? ? ? ? ?                           



                          ?| ?Stage three ? ? ? ?|                           



                          ? Stage three ? ? ? ? ?                           



                          +-----------------------                           



                          +---------------------+-                           



                          ------------------------                           



                          +| ?15-29 ? ? ? ? ? ? ?                           



                          ?| ?Stage four ? ? ? ? |                           



                          ? Stage four ? ? ? ? ?                           



                          ?+----------------------                           



                          -+---------------------+                           



                          ------------------------                           



                          -+| ?<15 (or dialysis) ?                           



                          ?| ?Stage five ? ? ? ? |                           



                          ? Stage five ? ? ? ? ?                           



                          ?+----------------------                           



                          -+---------------------+                           



                          ------------------------                           



                          -+ *Each stage assumes                           



                          the associated GFR level                           



                          has been in effect for                           



                          at least three months.                           



                          ?Stages 1 to 5, with or                           



                          without kidney disease,                           



                          indicate chronic kidney                           



                          disease. Notes:                           



                          Determination of stages                           



                          one and two (with eGFR                           



                          >59mL/min/1.73 m2)                           



                          requires estimation of                           



                          kidney damage for at                           



                          least three months as                           



                          defined by structural or                           



                          functional abnormalities                           



                          of the kidney,                           



                          manifested by                           



                          either:Pathological                           



                          abnormalities or Markers                           



                          of kidney damage                           



                          (including abnormalities                           



                          in the composition of                           



                          the blood or urine or                           



                          abnormalities in imaging                           



                          tests).                                



[facility] WITH [id number] 10:04:34





             Test Item    Value        Reference Range Interpretation Comments

 

             WBC (test code =              See_Comment                [Automated



             0298-2)                                             message] The sy

stem



                                                                 which generated



                                                                 this result



                                                                 transmitted



                                                                 reference range

:



                                                                 4.20 - 10.70



                                                                 10*3/?L. The



                                                                 reference range

 was



                                                                 not used to



                                                                 interpret this



                                                                 result as



                                                                 normal/abnormal

.

 

             RBC (test code =              See_Comment                [Automated



             561-8)                                              message] The sy

stem



                                                                 which generated



                                                                 this result



                                                                 transmitted



                                                                 reference range

:



                                                                 4.26 - 5.52



                                                                 10*6/?L. The



                                                                 reference range

 was



                                                                 not used to



                                                                 interpret this



                                                                 result as



                                                                 normal/abnormal

.

 

             HGB (test code = 14.8 g/dL    12.2-16.4                 



             718-7)                                              

 

             HCT (test code = 42.6 %       38.4-49.3                 



             4544-3)                                             

 

             MCV (test code = 80.8 fL      81.7-95.6    L            



             787-2)                                              

 

             MCH (test code = 28.1 pg      26.1-32.7                 



             785-6)                                              

 

             MCHC (test code = 34.7 g/dL    31.2-35.0                 



             786-4)                                              

 

             RDW-SD (test code = 33.6 fL      38.5-51.6    L            



             08570-8)                                            

 

             RDW-CV (test code = 11.6 %       12.1-15.4    L            



             788-0)                                              

 

             PLT (test code =              See_Comment                [Automated



             777-3)                                              message] The sy

stem



                                                                 which generated



                                                                 this result



                                                                 transmitted



                                                                 reference range

:



                                                                 150 - 328 10*3/

?L.



                                                                 The reference r

shagufta



                                                                 was not used to



                                                                 interpret this



                                                                 result as



                                                                 normal/abnormal

.

 

             MPV (test code = 11.4 fL      9.8-13.0                  



             85513-4)                                            

 

             NRBC/100 WBC (test              See_Comment                [Automat

ed



             code = 1569839654)                                        message] 

The system



                                                                 which generated



                                                                 this result



                                                                 transmitted



                                                                 reference range

:



                                                                 0.0 - 10.0 /100



                                                                 WBCs. The refer

ence



                                                                 range was not u

sed



                                                                 to interpret th

is



                                                                 result as



                                                                 normal/abnormal

.

 

             NRBC x10^3 (test code <0.01        See_Comment                [Auto

mated



             = 2113955456)                                        message] The s

ystem



                                                                 which generated



                                                                 this result



                                                                 transmitted



                                                                 reference range

:



                                                                 10*3/?L. The



                                                                 reference range

 was



                                                                 not used to



                                                                 interpret this



                                                                 result as



                                                                 normal/abnormal

.

 

             GRAN MAT (NEUT) % 59.0 %                                 



             (test code = 770-8)                                        

 

             IMM GRAN % (test code 0.70 %                                 



             = 3687849371)                                        

 

             LYMPH % (test code = 30.3 %                                 



             736-9)                                              

 

             MONO % (test code = 7.3 %                                  



             5905-5)                                             

 

             EOS % (test code = 2.3 %                                  



             713-8)                                              

 

             BASO % (test code = 0.4 %                                  



             706-2)                                              

 

             GRAN MAT x10^3(ANC) 4.37 10*3/uL 1.99-6.95                 



             (test code =                                        



             6359223940)                                         

 

             IMM GRAN x10^3 (test 0.05 10*3/uL 0.00-0.06                 



             code = 3520536675)                                        

 

             LYMPH x10^3 (test code 2.24 10*3/uL 1.09-3.23                 



             = 731-0)                                            

 

             MONO x10^3 (test code 0.54 10*3/uL 0.36-1.02                 



             = 742-7)                                            

 

             EOS x10^3 (test code = 0.17 10*3/uL 0.06-0.53                 



             711-2)                                              

 

             BASO x10^3 (test code 0.03 10*3/uL 0.01-0.09                 



             = 704-7)                                            

 

             Lab Interpretation Abnormal                               



             (test code = 82387-9)                                        



[facility]

## 2022-05-14 NOTE — RAD REPORT
EXAM DESCRIPTION:  RAD - Chest Single View - 5/14/2022 7:01 am

 

CLINICAL HISTORY:  rib pain

 

COMPARISON:  Chest Single View dated 8/24/2021; Chest Single View dated 6/14/2019

 

FINDINGS:  Lines: None.

Lungs: No evidence of edema or pneumonia.

Pleural: No significant pleural effusions or pneumothorax.

Cardiac: The heart size is within normal limits.

Bones: No acute fractures.

Other:

 

IMPRESSION:  No acute cardiopulmonary disease.
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
## 2022-05-14 NOTE — RAD REPORT
EXAM DESCRIPTION:  RAD - Hand Left 2 View - 5/14/2022 7:01 am

 

CLINICAL HISTORY:  PAIN

 

COMPARISON:  No comparisons

 

FINDINGS/IMPRESSION:  Dislocation at the fourth MCP joint. The dislocation is in the volar and radial
 direction. No fractures seen. Mild to moderate degenerative changes at the first CMC.